# Patient Record
Sex: MALE | Race: WHITE
[De-identification: names, ages, dates, MRNs, and addresses within clinical notes are randomized per-mention and may not be internally consistent; named-entity substitution may affect disease eponyms.]

---

## 2020-08-13 ENCOUNTER — HOSPITAL ENCOUNTER (OUTPATIENT)
Dept: HOSPITAL 46 - DS | Age: 70
Discharge: HOME | End: 2020-08-13
Attending: SURGERY
Payer: OTHER GOVERNMENT

## 2020-08-13 VITALS — HEIGHT: 71 IN | WEIGHT: 220.99 LBS | BODY MASS INDEX: 30.94 KG/M2

## 2020-08-13 DIAGNOSIS — Z79.899: ICD-10-CM

## 2020-08-13 DIAGNOSIS — R59.0: ICD-10-CM

## 2020-08-13 DIAGNOSIS — K21.9: ICD-10-CM

## 2020-08-13 DIAGNOSIS — K63.89: ICD-10-CM

## 2020-08-13 DIAGNOSIS — K64.8: Primary | ICD-10-CM

## 2020-08-13 DIAGNOSIS — K57.30: ICD-10-CM

## 2020-08-13 DIAGNOSIS — Z86.010: ICD-10-CM

## 2020-08-13 DIAGNOSIS — I25.10: ICD-10-CM

## 2020-08-13 DIAGNOSIS — N40.0: ICD-10-CM

## 2020-08-13 PROCEDURE — 0DJD8ZZ INSPECTION OF LOWER INTESTINAL TRACT, VIA NATURAL OR ARTIFICIAL OPENING ENDOSCOPIC: ICD-10-PCS | Performed by: SURGERY

## 2020-08-13 PROCEDURE — G0500 MOD SEDAT ENDO SERVICE >5YRS: HCPCS

## 2020-08-14 NOTE — OR
[facility]
                                    [address], [place]  [address]
_________________________________________________________________________________________
                                                                 Signed   
 
 
DATE OF OPERATION:
08/13/2020
 
SURGEON:
Bob De Oliveira MD
 
PREOPERATIVE DIAGNOSES:
1. Personal history of hyperplastic colonic polyps in 2009 at age 59.
2. Change in bowel habits with constipation and decreased frequency of bowel movements
every 2-3 days. 
 
POSTOPERATIVE DIAGNOSES:
1. Minimal sigmoid diverticulosis.
2. Tortuous sigmoid colon.
3. Internal anal skin tag x1.
4. Swollen, indurated prostate gland.
 
PROCEDURE:
Colonoscopy without biopsy.
 
ESTIMATED BLOOD LOSS:
None.
 
INDICATIONS:
Indar is a 70-year-old gentleman who had a colonoscopy back in 2009 at age 59.  He had
hyperplastic polyps removed at that time.  There is no family history of colon cancer or
polyps.  He feels like he has had a change in bowel habits.  He said he is only having
bowel movements every 2 or 3 days.  Consequently, he was asked to see me for a followup
colonoscopy.  I gave him a pamphlet on colonoscopy and we looked at that together along
with the risks including, but not limited to gas bloating, crampy abdominal pain,
bleeding, perforation requiring surgery, and missed diagnosis.  We also discussed the
need for IV conscious sedation, he had expressed understanding and wished to proceed. 
 
DESCRIPTION OF PROCEDURE:
Indra was taken into our endoscopy suite and placed in the left lateral decubitus
position.  He was given 5 mg of Versed and 100 mcg of fentanyl to cover the case.  A
digital rectal exam was performed and he does have a swollen indurated prostate.  The
left was more prominent than the right.  The adult colonoscope was introduced and
advanced all around into the cecum under direct visualization of camera.  It took some
extra sedation and abdominal compression in order to get the camera through the sigmoid
colon.  His prep was good.  We could easily see the appendiceal orifice and the
ileocecal valve.  The scope was slowly withdrawn.  We took pictures throughout for
 
    Electronically Signed By: BOB DE OLIVEIRA MD  08/14/20 0621
_________________________________________________________________________________________
PATIENT NAME:     INDRA HOLLIDAY                  
MEDICAL RECORD #: [id number]            OPERATIVE REPORT              
          ACCT #: [id number]  
DATE OF BIRTH:   [birth year]            REPORT #: [id number]      
PHYSICIAN:        BOB DE OLIVEIRA MD             
PCP:              ASHU CORCORAN MD           
REPORT IS CONFIDENTIAL AND NOT TO BE RELEASED WITHOUT AUTHORIZATION
 
 
                                  [facility]
                                    [address] [place] [address]  [id number]
_________________________________________________________________________________________
                                                                 Signed   
 
 
photodocumentation.  He had no polyps in the colon or rectum.  He did have diverticula
in the sigmoid colon.  Somewhat narrow and tortuous in the sigmoid colon.  Upon
retroflexion of scope in the rectum, he had just a single internal anal skin tag.  After
this, the gas was suctioned out and the colonoscope removed.  Indra tolerated the
procedure quite well. 
 
RECOMMENDATIONS:
Indra might consider adding fiber to his daily regimen.  Otherwise, he can follow up in
10 years for a repeat colonoscopy based on his hyperplastic polyps. 
 
 
 
            ________________________________________
            Bob De Oliveira MD 
 
 
ALB/MODL
Job #:  [id number]
DD:  08/13/2020 09:41:39
DT:  08/13/2020 10:27:22
 
cc:            MD Bob Raines MD
 
 
Copies:  JATINDER KING MD, ANDREW L MD
~
 
 
 
 
 
 
 
 
 
 
 
 
 
 
    Electronically Signed By: BOB DE OLIVEIRA MD  08/14/20 0621
_________________________________________________________________________________________
PATIENT NAME:     INDRA HOLLIDAY                  
MEDICAL RECORD #: [id number]            OPERATIVE REPORT              
          ACCT #: [id number]  
DATE OF BIRTH:   [birth year]            REPORT #: [id number]      
PHYSICIAN:        BOB DE OLIVEIRA MD             
PCP:              ASHU CORCORAN MD           
REPORT IS CONFIDENTIAL AND NOT TO BE RELEASED WITHOUT AUTHORIZATION